# Patient Record
Sex: FEMALE | Race: WHITE | NOT HISPANIC OR LATINO | Employment: OTHER
[De-identification: names, ages, dates, MRNs, and addresses within clinical notes are randomized per-mention and may not be internally consistent; named-entity substitution may affect disease eponyms.]

---

## 2021-10-20 ENCOUNTER — TRANSCRIBE ORDERS (OUTPATIENT)
Dept: OTHER | Age: 71
End: 2021-10-20

## 2021-10-20 DIAGNOSIS — R14.2 BELCHING: Primary | ICD-10-CM

## 2021-10-22 NOTE — TELEPHONE ENCOUNTER
REFERRAL INFORMATION:    Referring Provider:  Dr. Elizabeth Faith     Referring Clinic:  [facility]     Reason for Visit/Diagnosis: Belching      FUTURE VISIT INFORMATION:    Appointment Date: 2022    Appointment Time: 11 AM      NOTES STATUS DETAILS   OFFICE NOTE from Referring Provider Care Everywhere 2021, 8/10/2021, 2021 Office visit with Dr. Faith      OFFICE NOTE from Other Specialist Care Everywhere 2021 Office visit with HAM Patino CNP ([facility])     2020 Office visit with Dr. Vidhya Barajas ([facility])   HOSPITAL DISCHARGE SUMMARY/  ED VISITS N/A    OPERATIVE REPORT N/A    MEDICATION LIST N/A         ENDOSCOPY  Care Everywhere EGD: 2020 ([facility])    COLONOSCOPY N/A    ERCP N/A    EUS N/A    STOOL TESTING N/A    PERTINENT LABS Care Everywhere    PATHOLOGY REPORTS (RELATED) Care Everywhere 2020   IMAGING (CT, MRI, EGD, MRCP, Small Bowel Follow Through/SBT, MR/CT Enterography) Care Everywhere [facility]:  - NM Gastric Emptyin2021 7:37am Fax request sent to [facility] ([phone]) for image. Sam

## 2022-02-01 ENCOUNTER — PRE VISIT (OUTPATIENT)
Dept: GASTROENTEROLOGY | Facility: CLINIC | Age: 72
End: 2022-02-01